# Patient Record
Sex: MALE | Race: AMERICAN INDIAN OR ALASKA NATIVE
[De-identification: names, ages, dates, MRNs, and addresses within clinical notes are randomized per-mention and may not be internally consistent; named-entity substitution may affect disease eponyms.]

---

## 2019-01-21 ENCOUNTER — HOSPITAL ENCOUNTER (EMERGENCY)
Dept: HOSPITAL 5 - ED | Age: 29
Discharge: HOME | End: 2019-01-21
Payer: SELF-PAY

## 2019-01-21 VITALS — DIASTOLIC BLOOD PRESSURE: 61 MMHG | SYSTOLIC BLOOD PRESSURE: 122 MMHG

## 2019-01-21 DIAGNOSIS — H10.9: Primary | ICD-10-CM

## 2019-01-21 DIAGNOSIS — Z91.013: ICD-10-CM

## 2019-01-21 PROCEDURE — 99282 EMERGENCY DEPT VISIT SF MDM: CPT

## 2019-01-21 NOTE — EMERGENCY DEPARTMENT REPORT
Eye Injury/Foreign Body





- HPI


Duration: 2 Days


Eye Location: Right


Severity: Mild


Tetanus Status: Up to Date


Eye Symptoms: Eye Pain: No, Blurred Vision: No, Eye Redness: Yes, 

Grinding/Hammering Metal: No, Used Eye Protection: No, Contact Lens Use: No, 

Recalls Injury: No, Photophobia: No


Other History: 29 yo AA male who woke with r eye red and matted. child has same.

no eye pain. no blurred vision. no trauma.





ED Review of Systems


ROS: 


Stated complaint: POSS PINK EYE


Other details as noted in HPI





Comment: All other systems reviewed and negative


Constitutional: denies: fever


Eyes: as per HPI, eye discharge


ENT: denies: throat pain


Respiratory: denies: cough


Cardiovascular: denies: dyspnea on exertion


Endocrine: denies: flushing





ED Past Medical Hx





- Past Medical History


Previous Medical History?: No





- Surgical History


Past Surgical History?: No





- Social History


Smoking Status: Never Smoker


Substance Use Type: Marijuana





- Medications


Home Medications: 


                                Home Medications











 Medication  Instructions  Recorded  Confirmed  Last Taken  Type


 


Polymyxin B Sulf/Trimethoprim 10 ml OP Q4H #1 each 01/21/19  Unknown Rx





[Polytrim Eye Drops     





[id number]/0.1%]     














Eye Injury Exam





- Exam


General: 


Vital signs noted. No distress. Alert and acting appropriately.


r eye red


no trauma


eom intact


no change in vision


lungs cta


s1s2


ambulatory and nontoxic








ED Course


                                   Vital Signs











  01/21/19





  09:42


 


Temperature 98.2 F


 


Pulse Rate 82


 


Respiratory 18





Rate 


 


Blood Pressure 122/61


 


O2 Sat by Pulse 99





Oximetry 














ED Medical Decision Making





- Medical Decision Making





child with same








- Differential Diagnosis


simple conjunctivitis


Critical care attestation.: 


If time is entered above; I have spent that time in minutes in the direct care 

of this critically ill patient, excluding procedure time.








ED Disposition


Clinical Impression: 


 Conjunctivitis





Disposition: DC-01 TO HOME OR SELFCARE


Is pt being admited?: No


Does the pt Need Aspirin: No


Condition: Stable


Instructions:  Conjunctivitis (ED)


Prescriptions: 


Polymyxin B Sulf/Trimethoprim [Polytrim Eye Drops [id number]/0.1%] 10 ml OP Q4H 

#1 each


Referrals: 


BERNIE MAYEN MD [Staff Physician] - 3-5 Days


Time of Disposition: 10:17